# Patient Record
Sex: MALE | Race: WHITE | NOT HISPANIC OR LATINO | URBAN - METROPOLITAN AREA
[De-identification: names, ages, dates, MRNs, and addresses within clinical notes are randomized per-mention and may not be internally consistent; named-entity substitution may affect disease eponyms.]

---

## 2017-12-27 ENCOUNTER — EMERGENCY (EMERGENCY)
Facility: HOSPITAL | Age: 72
LOS: 1 days | Discharge: HOME | End: 2017-12-27
Attending: PERSONAL EMERGENCY RESPONSE ATTENDANT
Payer: MEDICARE

## 2017-12-27 VITALS
TEMPERATURE: 98 F | DIASTOLIC BLOOD PRESSURE: 92 MMHG | SYSTOLIC BLOOD PRESSURE: 168 MMHG | OXYGEN SATURATION: 98 % | RESPIRATION RATE: 18 BRPM | HEART RATE: 69 BPM

## 2017-12-27 VITALS
HEART RATE: 67 BPM | OXYGEN SATURATION: 98 % | SYSTOLIC BLOOD PRESSURE: 163 MMHG | RESPIRATION RATE: 16 BRPM | DIASTOLIC BLOOD PRESSURE: 75 MMHG

## 2017-12-27 LAB
ALBUMIN SERPL ELPH-MCNC: 4.6 G/DL — SIGNIFICANT CHANGE UP (ref 3.3–5)
ALP SERPL-CCNC: 80 U/L — SIGNIFICANT CHANGE UP (ref 40–120)
ALT FLD-CCNC: 19 U/L RC — SIGNIFICANT CHANGE UP (ref 10–45)
ANION GAP SERPL CALC-SCNC: 15 MMOL/L — SIGNIFICANT CHANGE UP (ref 5–17)
APTT BLD: 33.9 SEC — SIGNIFICANT CHANGE UP (ref 27.5–37.4)
AST SERPL-CCNC: 19 U/L — SIGNIFICANT CHANGE UP (ref 10–40)
BASOPHILS # BLD AUTO: 0.1 K/UL — SIGNIFICANT CHANGE UP (ref 0–0.2)
BASOPHILS NFR BLD AUTO: 0.9 % — SIGNIFICANT CHANGE UP (ref 0–2)
BILIRUB SERPL-MCNC: 0.8 MG/DL — SIGNIFICANT CHANGE UP (ref 0.2–1.2)
BUN SERPL-MCNC: 13 MG/DL — SIGNIFICANT CHANGE UP (ref 7–23)
CALCIUM SERPL-MCNC: 9.4 MG/DL — SIGNIFICANT CHANGE UP (ref 8.4–10.5)
CHLORIDE SERPL-SCNC: 102 MMOL/L — SIGNIFICANT CHANGE UP (ref 96–108)
CK MB CFR SERPL CALC: 2.1 NG/ML — SIGNIFICANT CHANGE UP (ref 0–6.7)
CK SERPL-CCNC: 69 U/L — SIGNIFICANT CHANGE UP (ref 30–200)
CO2 SERPL-SCNC: 25 MMOL/L — SIGNIFICANT CHANGE UP (ref 22–31)
CREAT SERPL-MCNC: 0.88 MG/DL — SIGNIFICANT CHANGE UP (ref 0.5–1.3)
EOSINOPHIL # BLD AUTO: 0.1 K/UL — SIGNIFICANT CHANGE UP (ref 0–0.5)
EOSINOPHIL NFR BLD AUTO: 2.2 % — SIGNIFICANT CHANGE UP (ref 0–6)
GAS PNL BLDV: SIGNIFICANT CHANGE UP
GLUCOSE SERPL-MCNC: 94 MG/DL — SIGNIFICANT CHANGE UP (ref 70–99)
HCT VFR BLD CALC: 48.4 % — SIGNIFICANT CHANGE UP (ref 39–50)
HGB BLD-MCNC: 16.9 G/DL — SIGNIFICANT CHANGE UP (ref 13–17)
INR BLD: 1.12 RATIO — SIGNIFICANT CHANGE UP (ref 0.88–1.16)
LYMPHOCYTES # BLD AUTO: 1.4 K/UL — SIGNIFICANT CHANGE UP (ref 1–3.3)
LYMPHOCYTES # BLD AUTO: 22.4 % — SIGNIFICANT CHANGE UP (ref 13–44)
MCHC RBC-ENTMCNC: 32.2 PG — SIGNIFICANT CHANGE UP (ref 27–34)
MCHC RBC-ENTMCNC: 34.9 GM/DL — SIGNIFICANT CHANGE UP (ref 32–36)
MCV RBC AUTO: 92.1 FL — SIGNIFICANT CHANGE UP (ref 80–100)
MONOCYTES # BLD AUTO: 1 K/UL — HIGH (ref 0–0.9)
MONOCYTES NFR BLD AUTO: 15.4 % — HIGH (ref 2–14)
NEUTROPHILS # BLD AUTO: 3.7 K/UL — SIGNIFICANT CHANGE UP (ref 1.8–7.4)
NEUTROPHILS NFR BLD AUTO: 59.1 % — SIGNIFICANT CHANGE UP (ref 43–77)
PLATELET # BLD AUTO: 187 K/UL — SIGNIFICANT CHANGE UP (ref 150–400)
POTASSIUM SERPL-MCNC: 3.9 MMOL/L — SIGNIFICANT CHANGE UP (ref 3.5–5.3)
POTASSIUM SERPL-SCNC: 3.9 MMOL/L — SIGNIFICANT CHANGE UP (ref 3.5–5.3)
PROT SERPL-MCNC: 7.7 G/DL — SIGNIFICANT CHANGE UP (ref 6–8.3)
PROTHROM AB SERPL-ACNC: 12.1 SEC — SIGNIFICANT CHANGE UP (ref 9.8–12.7)
RBC # BLD: 5.26 M/UL — SIGNIFICANT CHANGE UP (ref 4.2–5.8)
RBC # FLD: 12.4 % — SIGNIFICANT CHANGE UP (ref 10.3–14.5)
SODIUM SERPL-SCNC: 142 MMOL/L — SIGNIFICANT CHANGE UP (ref 135–145)
TROPONIN T SERPL-MCNC: <0.01 NG/ML — SIGNIFICANT CHANGE UP (ref 0–0.06)
WBC # BLD: 6.2 K/UL — SIGNIFICANT CHANGE UP (ref 3.8–10.5)
WBC # FLD AUTO: 6.2 K/UL — SIGNIFICANT CHANGE UP (ref 3.8–10.5)

## 2017-12-27 PROCEDURE — 70496 CT ANGIOGRAPHY HEAD: CPT | Mod: 26

## 2017-12-27 PROCEDURE — 82435 ASSAY OF BLOOD CHLORIDE: CPT

## 2017-12-27 PROCEDURE — 85730 THROMBOPLASTIN TIME PARTIAL: CPT

## 2017-12-27 PROCEDURE — 84132 ASSAY OF SERUM POTASSIUM: CPT

## 2017-12-27 PROCEDURE — 71020: CPT | Mod: 26

## 2017-12-27 PROCEDURE — 80053 COMPREHEN METABOLIC PANEL: CPT

## 2017-12-27 PROCEDURE — 83605 ASSAY OF LACTIC ACID: CPT

## 2017-12-27 PROCEDURE — 82330 ASSAY OF CALCIUM: CPT

## 2017-12-27 PROCEDURE — 99284 EMERGENCY DEPT VISIT MOD MDM: CPT | Mod: 25

## 2017-12-27 PROCEDURE — 85610 PROTHROMBIN TIME: CPT

## 2017-12-27 PROCEDURE — 82550 ASSAY OF CK (CPK): CPT

## 2017-12-27 PROCEDURE — 82553 CREATINE MB FRACTION: CPT

## 2017-12-27 PROCEDURE — 82947 ASSAY GLUCOSE BLOOD QUANT: CPT

## 2017-12-27 PROCEDURE — 84484 ASSAY OF TROPONIN QUANT: CPT

## 2017-12-27 PROCEDURE — 82803 BLOOD GASES ANY COMBINATION: CPT

## 2017-12-27 PROCEDURE — 70498 CT ANGIOGRAPHY NECK: CPT

## 2017-12-27 PROCEDURE — 85014 HEMATOCRIT: CPT

## 2017-12-27 PROCEDURE — 99285 EMERGENCY DEPT VISIT HI MDM: CPT | Mod: 25,GC

## 2017-12-27 PROCEDURE — 84295 ASSAY OF SERUM SODIUM: CPT

## 2017-12-27 PROCEDURE — 70496 CT ANGIOGRAPHY HEAD: CPT

## 2017-12-27 PROCEDURE — 93010 ELECTROCARDIOGRAM REPORT: CPT

## 2017-12-27 PROCEDURE — 70498 CT ANGIOGRAPHY NECK: CPT | Mod: 26

## 2017-12-27 PROCEDURE — 71046 X-RAY EXAM CHEST 2 VIEWS: CPT

## 2017-12-27 PROCEDURE — 85027 COMPLETE CBC AUTOMATED: CPT

## 2017-12-27 PROCEDURE — 93005 ELECTROCARDIOGRAM TRACING: CPT

## 2017-12-27 RX ORDER — MECLIZINE HCL 12.5 MG
1 TABLET ORAL
Qty: 9 | Refills: 0 | OUTPATIENT
Start: 2017-12-27 | End: 2017-12-29

## 2017-12-27 RX ORDER — MECLIZINE HCL 12.5 MG
50 TABLET ORAL ONCE
Qty: 0 | Refills: 0 | Status: COMPLETED | OUTPATIENT
Start: 2017-12-27 | End: 2017-12-27

## 2017-12-27 RX ADMIN — Medication 50 MILLIGRAM(S): at 15:12

## 2017-12-27 NOTE — ED PROVIDER NOTE - NEUROLOGICAL, MLM
Alert and oriented, no focal deficits, no motor or sensory deficits. CN 2-12 intact, no dysmetria. No motor/sensory deficit.

## 2017-12-27 NOTE — ED PROVIDER NOTE - OBJECTIVE STATEMENT
72 year old man PMH GERD, TIAs p/w dizziness. States that for past two days has had dizziness he first noticed when walking up stairs he felt unsteady on his feet, dizzy and like he was going to pass out. Made it up the stairs and sat down on toilet, denies LOC. Continues to have more mild dizziness. Went to [facility] [place] this who recommended abx for sinus infection but to go to ED for evaluation. Has hx of TIA that he says began similar to this, had recent carotid doppler that was negative with his PMD in [place], and an MRI that purportedly showed "stenosis" and wanted him evaluated by [facility] doctors. Aside from this has been in his usual state of health. No hx CAD known, had a cardiologist recommend he get angiogram but he declined.

## 2017-12-27 NOTE — ED PROVIDER NOTE - ATTENDING CONTRIBUTION TO CARE
Attending MD Don.  Agree with above.  Pt is a 72 yr old male presenting with complaitn of dizziness that began Monday ie. Room spinning.  Pt feels unsteady on his feet.  Sxs are worse with movement.  PT has had similar episodes previously and was told he has TIA’s.  Pt has been told he should receive a cath but he has refused.  Pt has not followed with cards/neurology.  Pt also feel she cannot get his words out as well as usual.  No aphasia/dysarthria noted on exam.  No objective nystagmus.  Concern for VBI given hx.  Pt is otherwise neurologically afocal. Attending MD Don.  Agree with above.  Pt is a 72 yr old male presenting with complaitn of dizziness that began Monday ie. Room spinning.  Pt feels unsteady on his feet.  Sxs are worse with movement.  PT has had similar episodes previously and was told he has TIA’s.  Pt has been told he should receive a cath but he has refused.  Pt has not followed with cards/neurology.  Pt also feel she cannot get his words out as well as usual.  No aphasia/dysarthria noted on exam.  No objective nystagmus.  Concern for VBI given hx.  Pt is otherwise neurologically afocal.  Pt also endorses R anterior/lateral neck pain radiating to base of skull for sev'l wks.  Pt was seen at urgent care and started on augmentin for 'sinus infection'.  He has multiple complaints most concerning of which include headahce, dizziness, R neck pain, word-finding difficulty not reproduced on exam. Attending MD Don.  Agree with above.  Pt is a 72 yr old male presenting with complaitn of dizziness that began Monday ie. Room spinning.  Pt feels unsteady on his feet.  Sxs are worse with movement.  PT has had similar episodes previously and was told he has TIA’s.  Pt has been told he should receive a cath but he has refused.  Pt has not followed with cards/neurology.  Pt also feel she cannot get his words out as well as usual.  No aphasia/dysarthria noted on exam.  No objective nystagmus.  Concern for VBI given hx.  Pt is otherwise neurologically afocal.  Pt also endorses R anterior/lateral neck pain radiating to base of skull for sev'l wks.  Pt was seen at urgent care and started on augmentin for 'sinus infection'.  He has multiple complaints most concerning of which include headache, dizziness, R neck pain, word-finding difficulty not reproduced on exam.  Pt is neurologically intact on exam with intact finger to nose, intact cerebellar testing, no CN deficits, equal bilateral upper and lower extremity strength, ambulatory without ataxia.  Plan to obtain CT head/CTA head/neck given report of R lateral neck pain over the last month without reproducible TTP.  PT denies any acute visual changes but endorses progressive macular degeneration dx.  Pt stable at time of signout to incoming team pending imaging and reassessment.

## 2017-12-27 NOTE — ED ADULT NURSE REASSESSMENT NOTE - NS ED NURSE REASSESS COMMENT FT1
Patient standing up with tele monitor on. States his dizziness has subsided "for the most part". Denies any pain/SOB/chest pain/N/V. Patient due to be discharged.

## 2017-12-27 NOTE — ED PROVIDER NOTE - PROGRESS NOTE DETAILS
Kristen Xiong M.D: pt signed out to me pending CTA. CTA wnl no stenosis. pt ambulating without difficulty, though still notes some symptomatic dizziness. discussed with pt that we could try other medication besides meclizine, pt refusing at this time. said meclizine helped and he is okay going home with that. will recommend pt stop augmentin.

## 2017-12-27 NOTE — ED ADULT NURSE NOTE - OBJECTIVE STATEMENT
72 y.o M presents to the ED from Urgent Care via EMS c/o of weakness and dizziness. Patient states that this morning while he was going to the bathroom he started to feel dizzy, "like he was going to pass out", and fell onto the toilet; states he did not actually faint or lose consciousness and reports he initially felt better afterwards but about 5 hours later he went to urgent care. Patient reports that he also went to Urgent Care on 12/25 and was prescribed Augmentin for a sinus infection; states he did not  the medication and start taking it until Tuesday 12/26. Patient presents A&Ox3 and afebrile; states he still feels dizzy, denies HA and visual changes, denies SO, lungs clear. Abdomen soft, nontender and nondistended, denies n/v/d, denies blood in stool and denies hematuria and dysuria. Patient has a PMHx of Prostate CA, macular degeneration.  HTN, HLD, Insomnia and hiatal hernia; reports the only daily medications he takes are Lunesta and Omeprazole. Cardiac monitoring initiated- Sinus Rhythm- HR 66. Yellow fall risk band and red non slip socks applied to patient. Will continue to monitor and patient safety maintained.

## 2017-12-28 RX ORDER — ESZOPICLONE 2 MG/1
0 TABLET, COATED ORAL
Qty: 0 | Refills: 0 | COMMUNITY

## 2017-12-28 RX ORDER — OMEPRAZOLE 10 MG/1
0 CAPSULE, DELAYED RELEASE ORAL
Qty: 0 | Refills: 0 | COMMUNITY

## 2021-09-03 NOTE — ED ADULT NURSE NOTE - PMH
Esophageal ulcer    HLD (hyperlipidemia)    HTN (hypertension)    Insomnia    Macular degeneration **ATTENDING ADDENDUM (Dr. Mio Mcintosh): Goals of care include resolution of emergent/urgent symptoms and concerns, and restoration to baseline level of homeostasis.